# Patient Record
Sex: MALE | Race: OTHER | Employment: FULL TIME | URBAN - METROPOLITAN AREA
[De-identification: names, ages, dates, MRNs, and addresses within clinical notes are randomized per-mention and may not be internally consistent; named-entity substitution may affect disease eponyms.]

---

## 2018-03-06 ENCOUNTER — OFFICE VISIT (OUTPATIENT)
Dept: FAMILY MEDICINE CLINIC | Facility: CLINIC | Age: 48
End: 2018-03-06

## 2018-03-06 ENCOUNTER — NURSE TRIAGE (OUTPATIENT)
Dept: INTERNAL MEDICINE CLINIC | Facility: CLINIC | Age: 48
End: 2018-03-06

## 2018-03-06 VITALS
SYSTOLIC BLOOD PRESSURE: 117 MMHG | DIASTOLIC BLOOD PRESSURE: 74 MMHG | HEART RATE: 59 BPM | HEIGHT: 69 IN | BODY MASS INDEX: 28.58 KG/M2 | TEMPERATURE: 98 F | RESPIRATION RATE: 20 BRPM | WEIGHT: 193 LBS

## 2018-03-06 DIAGNOSIS — R31.9 HEMATURIA, UNSPECIFIED TYPE: Primary | ICD-10-CM

## 2018-03-06 LAB
BILIRUBIN URINE: NEGATIVE
CONTROL RUN WITHIN 24 HOURS?: YES
GLUCOSE URINE: NEGATIVE
KETONE URINE: NEGATIVE
LEUKOCYTE ESTERASE URINE: NEGATIVE
NITRITE URINE: NEGATIVE
PH URINE: 7.5 (ref 5–8)
SPEC GRAVITY: 1 (ref 1–1.03)
URINE CLARITY: CLEAR
URINE COLOR: YELLOW
UROBILINOGEN URINE: 0.2

## 2018-03-06 PROCEDURE — 99213 OFFICE O/P EST LOW 20 MIN: CPT | Performed by: FAMILY MEDICINE

## 2018-03-06 PROCEDURE — 99212 OFFICE O/P EST SF 10 MIN: CPT | Performed by: FAMILY MEDICINE

## 2018-03-06 NOTE — TELEPHONE ENCOUNTER
Action Requested: Summary for Provider     []  Critical Lab, Recommendations Needed  [] Need Additional Advice  []   FYI    []   Need Orders  [] Need Medications Sent to Pharmacy  []  Other     SUMMARY: Advised OV today or tomorrow per protocol and pt ag

## 2018-03-06 NOTE — PROGRESS NOTES
HPI:    Patient ID: Sandee Dominguez is a 49year old male. 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 NO symptoms. No fevers. NO dysuria. Pt denies any urinary symptoms. No hx of gross bleeding if kidney stones.         Review of System

## 2019-10-07 ENCOUNTER — HOSPITAL ENCOUNTER (EMERGENCY)
Facility: HOSPITAL | Age: 49
Discharge: HOME | End: 2019-10-07
Attending: EMERGENCY MEDICINE
Payer: COMMERCIAL

## 2019-10-07 VITALS
OXYGEN SATURATION: 99 % | DIASTOLIC BLOOD PRESSURE: 86 MMHG | TEMPERATURE: 98 F | BODY MASS INDEX: 27.3 KG/M2 | WEIGHT: 195 LBS | HEART RATE: 62 BPM | HEIGHT: 71 IN | RESPIRATION RATE: 20 BRPM | SYSTOLIC BLOOD PRESSURE: 141 MMHG
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
## 2019-10-07 DIAGNOSIS — S39.012A STRAIN OF LUMBAR REGION, INITIAL ENCOUNTER: ICD-10-CM

## 2019-10-07 PROCEDURE — 99283 EMERGENCY DEPT VISIT LOW MDM: CPT

## 2019-10-07 RX ORDER — IBUPROFEN 600 MG/1
600 TABLET ORAL ONCE
Status: COMPLETED | OUTPATIENT
Start: 2019-10-07 | End: 2019-10-07

## 2019-10-07 RX ORDER — KETOROLAC TROMETHAMINE 10 MG/1
10 TABLET, FILM COATED ORAL EVERY 6 HOURS PRN
Qty: 15 TABLET | Refills: 0 | Status: SHIPPED | OUTPATIENT
Start: 2019-10-07 | End: 2019-10-14

## 2019-10-07 RX ORDER — CYCLOBENZAPRINE HCL 10 MG
10 TABLET ORAL 3 TIMES DAILY PRN
Qty: 12 TABLET | Refills: 0 | Status: SHIPPED | OUTPATIENT
Start: 2019-10-07 | End: 2019-10-14

## 2019-10-07 RX ORDER — ACETAMINOPHEN 500 MG
1000 TABLET ORAL ONCE
Status: COMPLETED | OUTPATIENT
Start: 2019-10-07 | End: 2019-10-07
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
## 2019-10-11 ENCOUNTER — OFFICE VISIT (OUTPATIENT)
Dept: INTERNAL MEDICINE CLINIC | Facility: CLINIC | Age: 49
End: 2019-10-11
Payer: COMMERCIAL

## 2019-10-11 VITALS
BODY MASS INDEX: 28.14 KG/M2 | DIASTOLIC BLOOD PRESSURE: 72 MMHG | TEMPERATURE: 98 F | HEART RATE: 50 BPM | HEIGHT: 71 IN | WEIGHT: 201 LBS | SYSTOLIC BLOOD PRESSURE: 129 MMHG
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
## 2019-10-11 DIAGNOSIS — M54.50 ACUTE BILATERAL LOW BACK PAIN WITHOUT SCIATICA: ICD-10-CM

## 2019-10-11 PROCEDURE — 99203 OFFICE O/P NEW LOW 30 MIN: CPT | Performed by: INTERNAL MEDICINE

## 2019-10-11 RX ORDER — DIAZEPAM 5 MG/1
5 TABLET ORAL EVERY 8 HOURS PRN
Qty: 30 TABLET | Refills: 1 | Status: SHIPPED | OUTPATIENT
Start: 2019-10-11 | End: 2020-05-12 | Stop reason: ALTCHOICE

## 2019-10-11 RX ORDER — PREDNISONE 20 MG/1
40 TABLET ORAL DAILY
Qty: 10 TABLET | Refills: 0 | Status: SHIPPED | OUTPATIENT
Start: 2019-10-11 | End: 2019-10-16
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
## 2019-10-11 NOTE — PATIENT INSTRUCTIONS
1.  Take steroids as prescribed. 2.  Take diazepam as needed 3 times a day for your back pain. 3.  Continue physical therapy that is been set up by your . 4.  Recommend doing aqua therapy as this is very low impact on your joints.   5.  Remain ac your thigh to help pull. 3. Hold for 30 to 60 seconds. Lower your leg back down to the floor. 4. Repeat 2 times, or as instructed. 5. Switch legs and repeat. © 5732-9069 [employer] [address] [facility] [place] [facility], [facility] [place].  All rights

## 2019-10-18 ENCOUNTER — OFFICE VISIT (OUTPATIENT)
Dept: INTERNAL MEDICINE CLINIC | Facility: CLINIC | Age: 49
End: 2019-10-18
Payer: COMMERCIAL

## 2019-10-18 VITALS
HEART RATE: 62 BPM | SYSTOLIC BLOOD PRESSURE: 135 MMHG | BODY MASS INDEX: 28.14 KG/M2 | TEMPERATURE: 98 F | HEIGHT: 71 IN | DIASTOLIC BLOOD PRESSURE: 75 MMHG | WEIGHT: 201 LBS

## 2019-10-18 DIAGNOSIS — M54.50 ACUTE BILATERAL LOW BACK PAIN WITHOUT SCIATICA: ICD-10-CM
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
## 2019-10-18 PROCEDURE — 99214 OFFICE O/P EST MOD 30 MIN: CPT | Performed by: INTERNAL MEDICINE

## 2019-10-18 RX ORDER — PREDNISONE 10 MG/1
TABLET ORAL
Refills: 0 | COMMUNITY
Start: 2019-10-11 | End: 2020-05-12 | Stop reason: ALTCHOICE
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
## 2019-11-01 ENCOUNTER — OFFICE VISIT (OUTPATIENT)
Dept: INTERNAL MEDICINE CLINIC | Facility: CLINIC | Age: 49
End: 2019-11-01
Payer: COMMERCIAL

## 2019-11-01 VITALS
DIASTOLIC BLOOD PRESSURE: 76 MMHG | BODY MASS INDEX: 27.58 KG/M2 | TEMPERATURE: 98 F | HEIGHT: 71 IN | WEIGHT: 197 LBS | HEART RATE: 62 BPM | SYSTOLIC BLOOD PRESSURE: 129 MMHG
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
## 2019-11-01 DIAGNOSIS — M54.50 ACUTE BILATERAL LOW BACK PAIN WITHOUT SCIATICA: ICD-10-CM

## 2019-11-01 PROCEDURE — 99213 OFFICE O/P EST LOW 20 MIN: CPT | Performed by: INTERNAL MEDICINE

## 2019-11-01 RX ORDER — CYCLOBENZAPRINE HCL 10 MG
10 TABLET ORAL 2 TIMES DAILY
Refills: 0 | COMMUNITY
Start: 2019-10-14 | End: 2020-05-12 | Stop reason: ALTCHOICE

## 2019-11-01 RX ORDER — NAPROXEN 500 MG/1
500 TABLET ORAL 2 TIMES DAILY
Refills: 0 | COMMUNITY
Start: 2019-10-14 | End: 2020-05-12 | Stop reason: ALTCHOICE
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
## 2019-11-22 ENCOUNTER — TELEPHONE (OUTPATIENT)
Dept: ADMINISTRATIVE | Age: 49
End: 2019-11-22

## 2019-11-22 NOTE — TELEPHONE ENCOUNTER
Patient dropped off STD disability form from [address] [place] [address]. Signed HIPAA and will bill $25 fee. [place]

## 2019-12-05 NOTE — TELEPHONE ENCOUNTER
Dr. Rashawn Watkins,    Please sign off on form:  -Highlight the patient and hit \"Chart\" button. -In Chart Review, w/in the Encounter tab - click 1 time on the Telephone call encounter for 11/22/19.  Scroll down the telephone encounter.  -Click \"scan on\" blue Hy

## 2020-05-07 ENCOUNTER — APPOINTMENT (OUTPATIENT)
Dept: GENERAL RADIOLOGY | Facility: HOSPITAL | Age: 50
End: 2020-05-07
Attending: EMERGENCY MEDICINE
Payer: COMMERCIAL

## 2020-05-07 ENCOUNTER — HOSPITAL ENCOUNTER (EMERGENCY)
Facility: HOSPITAL | Age: 50
Discharge: HOME | End: 2020-05-07
Attending: EMERGENCY MEDICINE
Payer: COMMERCIAL

## 2020-05-07 VITALS
SYSTOLIC BLOOD PRESSURE: 145 MMHG | BODY MASS INDEX: 27.3 KG/M2 | TEMPERATURE: 98 F | HEIGHT: 71 IN | OXYGEN SATURATION: 97 % | HEART RATE: 60 BPM | RESPIRATION RATE: 18 BRPM | WEIGHT: 195 LBS | DIASTOLIC BLOOD PRESSURE: 89 MMHG

## 2020-05-07 DIAGNOSIS — S42.91XA TRAUMATIC CLOSED DISPLACED FRACTURE OF RIGHT SHOULDER WITH ANTERIOR DISLOCATION, INITIAL ENCOUNTER: Primary | ICD-10-CM

## 2020-05-07 PROCEDURE — 99285 EMERGENCY DEPT VISIT HI MDM: CPT

## 2020-05-07 PROCEDURE — 96375 TX/PRO/DX INJ NEW DRUG ADDON: CPT

## 2020-05-07 PROCEDURE — 96374 THER/PROPH/DIAG INJ IV PUSH: CPT

## 2020-05-07 PROCEDURE — 73030 X-RAY EXAM OF SHOULDER: CPT | Performed by: EMERGENCY MEDICINE

## 2020-05-07 PROCEDURE — 23650 CLTX SHO DSLC W/MNPJ WO ANES: CPT

## 2020-05-07 RX ORDER — MORPHINE SULFATE 4 MG/ML
4 INJECTION, SOLUTION INTRAMUSCULAR; INTRAVENOUS ONCE
Status: COMPLETED | OUTPATIENT
Start: 2020-05-07 | End: 2020-05-07

## 2020-05-07 RX ORDER — HYDROCODONE BITARTRATE AND ACETAMINOPHEN 5; 325 MG/1; MG/1
1 TABLET ORAL EVERY 6 HOURS PRN
Qty: 10 TABLET | Refills: 0 | Status: SHIPPED | OUTPATIENT
Start: 2020-05-07 | End: 2020-05-12 | Stop reason: ALTCHOICE

## 2020-05-07 NOTE — ED NOTES
Pt ready for discharge home with his wife. AOx4 at this time, c/o continued soreness to right shoulder. Right arm in sling. Discharge instructions reviewed, pt verbalizes understanding.

## 2020-05-07 NOTE — ED PROVIDER NOTES
Patient Seen in: [facility] AND [facility] Emergency Department      History   Patient presents with:  Upper Extremity Injury    Stated Complaint: shoulder     HPI    54-year-old male presents for complaint of right shoulder pain after a fall.   Patient states th Resp 24   Ht 180.3 cm (5' 11\")   Wt 88.5 kg   SpO2 94%   BMI 27.20 kg/m²         Physical Exam  Vitals signs and nursing note reviewed. Constitutional:       Appearance: Normal appearance. He is well-developed.    HENT:      Head: Normocephalic and atra General: No deformity. Right shoulder: He exhibits decreased range of motion, tenderness and bony tenderness.       Left shoulder: Normal.      Right elbow: Normal.     Left elbow: Normal.      Right wrist: Normal.      Left wrist: Normal.      Right h supervision the patient was given propofol. An appropriate level of sedation was achieved. The patient remained hemodynamically stable with normal oxygen saturations during the procedure. There were no complications related to the sedation.  Patient was obs has been relocation of previously seen anterior dislocation of the humeral head. No well-defined fracture. Moderate degenerative narrowing of the AC joint. SOFT TISSUES: Negative. No visible soft tissue swelling. EFFUSION: None visible. OTHER: Negative. soon as possible for a visit in 2 days  For follow up and re-evaluation    Mi Pereyra MD  [address]  [address]  [phone]    Call today  To schedule follow up and re-evaluation          Medications Prescribed:  Cur
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
## 2020-05-09 ENCOUNTER — TELEPHONE (OUTPATIENT)
Dept: INTERNAL MEDICINE CLINIC | Facility: CLINIC | Age: 50
End: 2020-05-09

## 2020-05-09 NOTE — TELEPHONE ENCOUNTER
Patient requesting an ER follow up appointment. Please confirm if we are able to schedule in office appointment or phone/video appointment.

## 2020-05-12 ENCOUNTER — OFFICE VISIT (OUTPATIENT)
Dept: INTERNAL MEDICINE CLINIC | Facility: CLINIC | Age: 50
End: 2020-05-12
Payer: COMMERCIAL

## 2020-05-12 ENCOUNTER — TELEPHONE (OUTPATIENT)
Dept: ORTHOPEDICS CLINIC | Facility: CLINIC | Age: 50
End: 2020-05-12

## 2020-05-12 VITALS
HEART RATE: 73 BPM | WEIGHT: 207 LBS | TEMPERATURE: 98 F | BODY MASS INDEX: 28.98 KG/M2 | SYSTOLIC BLOOD PRESSURE: 135 MMHG | DIASTOLIC BLOOD PRESSURE: 80 MMHG | HEIGHT: 71 IN

## 2020-05-12 DIAGNOSIS — S43.004S DISLOCATION OF RIGHT SHOULDER JOINT, SEQUELA: Primary | ICD-10-CM

## 2020-05-12 PROCEDURE — 99214 OFFICE O/P EST MOD 30 MIN: CPT | Performed by: INTERNAL MEDICINE

## 2020-05-12 RX ORDER — TRAMADOL HYDROCHLORIDE 50 MG/1
50 TABLET ORAL EVERY 6 HOURS PRN
Qty: 30 TABLET | Refills: 1 | Status: SHIPPED | OUTPATIENT
Start: 2020-05-12

## 2020-05-12 NOTE — TELEPHONE ENCOUNTER
S/w pt and he states he was doing spring cleaning and fell on 5/7. Pt went to ER and had reduction. Pt states he continues to have pain and also swelling in his hand. appt made on 5/13 @ 1050am with dr. Padilla/nicholas.
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
## 2020-05-12 NOTE — TELEPHONE ENCOUNTER
Penelope Ward from Dr. Roni Montalvo office calling to request a sooner appointment for pt for Dislocation of right shoulder joint. Unable to reach the nurse. Call pt to set up the appointment.

## 2020-05-12 NOTE — PATIENT INSTRUCTIONS
Shoulder Dislocation, Reduced    A shoulder is dislocated when a strong force injures, and possibly tears the ligaments that hold the shoulder joint together. The bones that make up the joint then move apart and become stuck out of place.  The joint must liver or kidney disease. Also talk with your provider if you have had a stomach ulcer or digestive bleeding. · Don’t take part in sports or physical education classes until your doctor says it’s OK.   Follow-up care  Follow up with your healthcare provider

## 2020-05-13 ENCOUNTER — OFFICE VISIT (OUTPATIENT)
Dept: ORTHOPEDICS CLINIC | Facility: CLINIC | Age: 50
End: 2020-05-13
Payer: COMMERCIAL

## 2020-05-13 DIAGNOSIS — S43.004A SHOULDER DISLOCATION, RIGHT, INITIAL ENCOUNTER: Primary | ICD-10-CM

## 2020-05-13 PROCEDURE — 99203 OFFICE O/P NEW LOW 30 MIN: CPT | Performed by: ORTHOPAEDIC SURGERY

## 2020-05-13 RX ORDER — GABAPENTIN 100 MG/1
CAPSULE ORAL
COMMUNITY
Start: 2020-04-07

## 2020-05-13 NOTE — PROGRESS NOTES
NURSING INTAKE COMMENTS: Patient presents with:  Consult: Right shoulder injury-he states he was doing spring cleaning and fell on 5/7. Pt went to ER and had reduction.  pt states his pcp rx'd tramadol, but its not helping       HPI: This 47year old male p of breath  CARDIOVASCULAR: denies chest pain  GI: no hematemesis, no worsening heartburn, no diarrhea  : no dysuria, no blood in urine, no difficulty urinating, no incontinence  MUSCULOSKELETAL: no other musculoskeletal complaints other than in HPI  NEUR (CPT=73030)  COMPARISON: None. INDICATIONS: Right shoulder pain with limited range of motion post fall today. TECHNIQUE: 3 views were obtained. FINDINGS:  BONES: There is an anterior dislocation of the humeral head in relation to the glenoid.   No fract Jo Ann Dobbins PA-C for Dr. Asaf Anguiano who was present for the patient's evaluation and performed all medical decision-making. I personally evaluated the patient and performed all key components of the service rendered.  I shared my findings with the person doc

## 2020-05-21 ENCOUNTER — HOSPITAL ENCOUNTER (OUTPATIENT)
Dept: MRI IMAGING | Facility: HOSPITAL | Age: 50
Discharge: HOME | End: 2020-05-21
Attending: PHYSICIAN ASSISTANT
Payer: COMMERCIAL

## 2020-05-21 DIAGNOSIS — S43.004A SHOULDER DISLOCATION, RIGHT, INITIAL ENCOUNTER: ICD-10-CM

## 2020-05-21 PROCEDURE — 73221 MRI JOINT UPR EXTREM W/O DYE: CPT | Performed by: PHYSICIAN ASSISTANT

## 2020-05-28 ENCOUNTER — TELEPHONE (OUTPATIENT)
Dept: ORTHOPEDICS CLINIC | Facility: CLINIC | Age: 50
End: 2020-05-28

## 2020-05-28 NOTE — TELEPHONE ENCOUNTER
Spoke to pt and offered phone visit with Randy to discuss MRI results but pt preferred office visit. Appt scheduled for 06/08/20 at [facility] Pending sale to [facility]. Dr Padilla/MOE Dominguez, pt requesting work note since appt is for 06/08/20.  Pt is a  and has

## 2020-05-28 NOTE — TELEPHONE ENCOUNTER
Dr. Padilla/MOE Dominguez, please advise. Do you want to set up a phone visit to discuss MRI results or in office visit?

## 2020-05-29 NOTE — TELEPHONE ENCOUNTER
Pt notified per Dr. Suzanne Dye message. He states he doesn't need a note for work he just needed to know he could work. He would like appt on 6/8 changed to televisit. appt changed.

## 2020-06-08 ENCOUNTER — VIRTUAL PHONE E/M (OUTPATIENT)
Dept: ORTHOPEDICS CLINIC | Facility: CLINIC | Age: 50
End: 2020-06-08
Payer: COMMERCIAL

## 2020-06-08 DIAGNOSIS — S43.004A SHOULDER DISLOCATION, RIGHT, INITIAL ENCOUNTER: Primary | ICD-10-CM

## 2020-06-08 PROCEDURE — 99024 POSTOP FOLLOW-UP VISIT: CPT | Performed by: ORTHOPAEDIC SURGERY

## 2020-06-08 NOTE — PROGRESS NOTES
NURSING INTAKE COMMENTS: Improved shoulder pain. HPI: This 47year old male is under our care for his right shoulder. We saw him about 3 weeks ago following a shoulder dislocation that was reduced in the emergency room. We sent him for an MRI.   We had no hx of blood dyscrasia  ENDOCRINE: no thyroid or diabetes issues  ALL/ASTHMA: no new hx of severe allergy or asthma        Imaging: Mri Shoulder, Right (cpt=73221)    Result Date: 5/22/2020  PROCEDURE: MRI SHOULDER, RIGHT (CPT=73221)  COMPARISON: Chris the anterior inferior glenoid labrum with mild subchondral edema, as well as a subchondral cyst at the 6 o'clock position. OTHER BONES: There is a mildly displaced osseous Bankart lesion with associated marrow edema.   T2 hyperintense foci are noted in the encounter        Plan: We went over the MRI. 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 We discussed given his age the likelihood of a recurrent dislocation is unlikely but, but possible.   I offere

## (undated) NOTE — LETTER
5/29/2020          To Whom It May Concern:    Jud Schilder is currently under my medical care. He may   If you require additional information please contact our office.         Sincerely,    Miles Martinez MD          Document generated by:  Kana Antonio

## (undated) NOTE — LETTER
Date & Time: 10/7/2019, 9:10 PM  Patient: Rigoberto Vidal  Encounter Provider(s):    Rosario Ricks MD       To Whom It May Concern:    Rigoberto Vidal was seen and treated in our department on 10/7/2019. He is excused from work for 2-3 days.     If you have

## (undated) NOTE — ED AVS SNAPSHOT
Fredy Ramirez   MRN: [id number]    Department:  [facility] Emergency Department   Date of Visit:  10/7/2019           Disclosure     Insurance plans vary and the physician(s) referred by the ER may not be covered by your plan.  Please contact yo CARE PHYSICIAN AT ONCE OR RETURN IMMEDIATELY TO THE EMERGENCY DEPARTMENT. If you have been prescribed any medication(s), please fill your prescription right away and begin taking the medication(s) as directed.   If you believe that any of the medications

## (undated) NOTE — LETTER
10/11/2019          To Whom It May Concern:    Tiny Buggy is currently under my medical care and may not return to work at this time. Please excuse Niraj Wilburn for 10 days. He may return to work on 10/29/2019.   Activity is restricted as follows: light d